# Patient Record
Sex: FEMALE | Race: AMERICAN INDIAN OR ALASKA NATIVE
[De-identification: names, ages, dates, MRNs, and addresses within clinical notes are randomized per-mention and may not be internally consistent; named-entity substitution may affect disease eponyms.]

---

## 2017-06-13 ENCOUNTER — PRIOR ORIGINAL RECORDS (OUTPATIENT)
Dept: OTHER | Age: 52
End: 2017-06-13

## 2017-06-13 ENCOUNTER — MYAURORA ACCOUNT LINK (OUTPATIENT)
Dept: OTHER | Age: 52
End: 2017-06-13

## 2017-06-19 ENCOUNTER — MYAURORA ACCOUNT LINK (OUTPATIENT)
Dept: OTHER | Age: 52
End: 2017-06-19

## 2017-06-21 ENCOUNTER — PRIOR ORIGINAL RECORDS (OUTPATIENT)
Dept: OTHER | Age: 52
End: 2017-06-21

## 2019-03-01 VITALS — HEART RATE: 62 BPM | DIASTOLIC BLOOD PRESSURE: 70 MMHG | SYSTOLIC BLOOD PRESSURE: 108 MMHG | WEIGHT: 135 LBS

## 2019-06-23 NOTE — ED PROVIDER NOTES
Patient presents with:  Laceration Abrasion (integumentary)      HPI:     Janae Aragon is a 52year old female with no significant past medical history presents with a laceration.   Patient reports yesterday around 2030 she was walking in high heels and trip drainage or any other concerns should be seen immediately. Patient verbalized plan of care and stated understanding    No orders of the defined types were placed in this encounter.       Labs performed this visit:  No results found for this or any previous

## 2019-12-07 ENCOUNTER — APPOINTMENT (OUTPATIENT)
Dept: GENERAL RADIOLOGY | Facility: HOSPITAL | Age: 54
End: 2019-12-07
Attending: EMERGENCY MEDICINE
Payer: COMMERCIAL

## 2019-12-07 ENCOUNTER — HOSPITAL ENCOUNTER (EMERGENCY)
Facility: HOSPITAL | Age: 54
Discharge: HOME | End: 2019-12-07
Attending: EMERGENCY MEDICINE
Payer: COMMERCIAL

## 2019-12-07 VITALS
DIASTOLIC BLOOD PRESSURE: 78 MMHG | RESPIRATION RATE: 20 BRPM | OXYGEN SATURATION: 96 % | TEMPERATURE: 98 F | BODY MASS INDEX: 24.45 KG/M2 | HEIGHT: 63 IN | HEART RATE: 63 BPM | SYSTOLIC BLOOD PRESSURE: 130 MMHG | WEIGHT: 138 LBS

## 2019-12-07 DIAGNOSIS — S62.102A CLOSED FRACTURE OF LEFT WRIST, INITIAL ENCOUNTER: Primary | ICD-10-CM

## 2019-12-07 PROCEDURE — 99284 EMERGENCY DEPT VISIT MOD MDM: CPT

## 2019-12-07 PROCEDURE — 73100 X-RAY EXAM OF WRIST: CPT | Performed by: EMERGENCY MEDICINE

## 2019-12-07 PROCEDURE — 29125 APPL SHORT ARM SPLINT STATIC: CPT

## 2019-12-07 RX ORDER — IBUPROFEN 600 MG/1
600 TABLET ORAL ONCE
Status: COMPLETED | OUTPATIENT
Start: 2019-12-07 | End: 2019-12-07

## 2019-12-08 NOTE — ED PROVIDER NOTES
Patient Seen in: [facility] AND [facility] Emergency Department      History   Patient presents with:  Upper Extremity Injury    Stated Complaint: wrist pain    HPI    Patient is a 20-year-old right-handed female who presents with left wrist injury that occurred f/u with orthopedics. Provided with ibuprofen in ED. A short arm splint was applied to the left wrist.  After application of the splint I returned and re-examined the patient.   The splint was adequately immobilizing the joint and distal to the splint th

## 2019-12-08 NOTE — ED INITIAL ASSESSMENT (HPI)
Pt fell of a ladder yesterday. Landed on her left wrist. Swelling and bruising noted. No deformity noted. CMS intact.

## 2019-12-09 ENCOUNTER — HOSPITAL ENCOUNTER (EMERGENCY)
Facility: HOSPITAL | Age: 54
Discharge: LEFT BEFORE DISCHARGE | End: 2019-12-09
Payer: COMMERCIAL

## 2019-12-10 ENCOUNTER — TELEPHONE (OUTPATIENT)
Dept: ORTHOPEDICS CLINIC | Facility: CLINIC | Age: 54
End: 2019-12-10

## 2019-12-10 ENCOUNTER — OFFICE VISIT (OUTPATIENT)
Dept: ORTHOPEDICS CLINIC | Facility: CLINIC | Age: 54
End: 2019-12-10
Payer: COMMERCIAL

## 2019-12-10 VITALS — WEIGHT: 138 LBS | BODY MASS INDEX: 24.45 KG/M2 | HEIGHT: 63 IN

## 2019-12-10 DIAGNOSIS — S52.532A CLOSED COLLES' FRACTURE OF LEFT RADIUS, INITIAL ENCOUNTER: Primary | ICD-10-CM

## 2019-12-10 PROCEDURE — 99204 OFFICE O/P NEW MOD 45 MIN: CPT | Performed by: ORTHOPAEDIC SURGERY

## 2019-12-10 PROCEDURE — 25600 CLTX DST RDL FX/EPHYS SEP WO: CPT | Performed by: ORTHOPAEDIC SURGERY

## 2019-12-10 NOTE — H&P
NURSING INTAKE COMMENTS: Patient presents with:   Injury: Left wrist - onset 12/6/19 while being on a ladder she fell on the L wrist - was in ER next day and has x-rays in the system - was told she has a fx - has a soft cast on - has pain rated as 6/10 on a worsening heartburn, no diarrhea  : no dysuria, no blood in urine, no difficulty urinating, no incontinence  MUSCULOSKELETAL: no other musculoskeletal complaints other than in HPI  NEURO: no numbness or tingling, no weakness or balance disorder  PSYCHE: [facility] teleradiology service. There are no major discrepancies.   Dictated by (CST): Thuy Rendon MD on 12/08/2019 at 7:23     Approved by (CST): Thuy Rendon MD on 12/08/2019 at 7:25             No results found for: WBC, HGB, PLT   N

## 2019-12-10 NOTE — TELEPHONE ENCOUNTER
Pt went to [facility] emergency room 12-7-19. Closed fracture left wrist.  Soft cast.  Pt's friend is a pt of Dr. Raissa Lozada and would like to be seen. Pt is not sure when pt should be seen.   Please call pt to advise

## 2019-12-10 NOTE — TELEPHONE ENCOUNTER
-- Message is from the [facility]--    Reason for Call: Patient checking the status of refill needs medication today please follow up to advise     Caller Information       Type Contact Phone    07/27/2019 09:22 AM Interface (Incoming) [employer] [phone]          Alternative phone number: none    Turnaround time given to caller:   \"This message will be sent to [state Provider's name]. The clinical team will fulfill your request as soon as they review your message when the office opens on Monday (or after the holiday).\"     S/w pt and she states she fell off a ladder while cleaning windows and injured left wrist on 12/5 while in [place]. Pt went to ER on 12/7/19 and put in temp splint and had XR. appt made today @ 130pm w/ Dr. Milena Shetty. Directions given.

## 2019-12-17 ENCOUNTER — HOSPITAL ENCOUNTER (OUTPATIENT)
Dept: GENERAL RADIOLOGY | Facility: HOSPITAL | Age: 54
Discharge: HOME | End: 2019-12-17
Attending: ORTHOPAEDIC SURGERY
Payer: COMMERCIAL

## 2019-12-17 ENCOUNTER — OFFICE VISIT (OUTPATIENT)
Dept: ORTHOPEDICS CLINIC | Facility: CLINIC | Age: 54
End: 2019-12-17
Payer: COMMERCIAL

## 2019-12-17 VITALS — WEIGHT: 138 LBS | HEIGHT: 63 IN | BODY MASS INDEX: 24.45 KG/M2

## 2019-12-17 DIAGNOSIS — S52.532A CLOSED COLLES' FRACTURE OF LEFT RADIUS, INITIAL ENCOUNTER: ICD-10-CM

## 2019-12-17 DIAGNOSIS — Z47.89 ORTHOPEDIC AFTERCARE: Primary | ICD-10-CM

## 2019-12-17 DIAGNOSIS — Z47.89 ORTHOPEDIC AFTERCARE: ICD-10-CM

## 2019-12-17 PROCEDURE — 99024 POSTOP FOLLOW-UP VISIT: CPT | Performed by: ORTHOPAEDIC SURGERY

## 2019-12-17 PROCEDURE — 99212 OFFICE O/P EST SF 10 MIN: CPT | Performed by: ORTHOPAEDIC SURGERY

## 2019-12-17 PROCEDURE — 73110 X-RAY EXAM OF WRIST: CPT | Performed by: ORTHOPAEDIC SURGERY

## 2019-12-17 NOTE — PROGRESS NOTES
NURSING INTAKE COMMENTS: Patient presents with:  Fracture: Left wrist f/u - states she still has some pain rated as 3/10 on and off       HPI: This 62year old female presents today 11 days after left distal radius fracture.   He had no significant complain moves them all and flexion-extension easily and well. The cast was relatively clean and there were no sores proximally or distally. It was fitting comfortably. Musculoskeletal: As above  Neurological: Normal to all 5 digits of the left hand.     Imaging: both sides and wrapped with an Ace wrap in case it has to be removed emergently. She will find an orthopod in her hometown [place] to take an x-ray in the next 2 weeks. I anticipated the orthopod would also see her another time after that.   She will be

## 2022-02-14 ENCOUNTER — OFFICE VISIT (OUTPATIENT)
Dept: INTERNAL MEDICINE CLINIC | Facility: CLINIC | Age: 57
End: 2022-02-14
Payer: COMMERCIAL

## 2022-02-14 ENCOUNTER — HOSPITAL ENCOUNTER (OUTPATIENT)
Dept: CT IMAGING | Age: 57
Discharge: HOME | End: 2022-02-14
Attending: NURSE PRACTITIONER
Payer: COMMERCIAL

## 2022-02-14 VITALS
BODY MASS INDEX: 28.53 KG/M2 | OXYGEN SATURATION: 98 % | TEMPERATURE: 98 F | SYSTOLIC BLOOD PRESSURE: 138 MMHG | HEART RATE: 63 BPM | WEIGHT: 161 LBS | HEIGHT: 63 IN | DIASTOLIC BLOOD PRESSURE: 82 MMHG

## 2022-02-14 DIAGNOSIS — G44.85 PRIMARY STABBING HEADACHE: ICD-10-CM

## 2022-02-14 DIAGNOSIS — G44.52 NEW DAILY PERSISTENT HEADACHE: ICD-10-CM

## 2022-02-14 DIAGNOSIS — G44.52 NEW DAILY PERSISTENT HEADACHE: Primary | ICD-10-CM

## 2022-02-14 PROCEDURE — 3008F BODY MASS INDEX DOCD: CPT | Performed by: NURSE PRACTITIONER

## 2022-02-14 PROCEDURE — 3079F DIAST BP 80-89 MM HG: CPT | Performed by: NURSE PRACTITIONER

## 2022-02-14 PROCEDURE — 3075F SYST BP GE 130 - 139MM HG: CPT | Performed by: NURSE PRACTITIONER

## 2022-02-14 PROCEDURE — 70450 CT HEAD/BRAIN W/O DYE: CPT | Performed by: NURSE PRACTITIONER

## 2022-02-14 PROCEDURE — 99213 OFFICE O/P EST LOW 20 MIN: CPT | Performed by: NURSE PRACTITIONER

## 2022-02-14 NOTE — PATIENT INSTRUCTIONS
Excedrin for Migraine two tabs every 24 hours. Simply saline spray every evening  Zyrtec 10 mg po nightly. Cold packs over left eye.   CT of head

## 2022-02-14 NOTE — ASSESSMENT & PLAN NOTE
47-year-old female who does not have a primary care doctor is seeking medical care for her headache. She went to [place] and had a Covid test that was very painful in her lab not to [facility]. A week later she had nosebleeds and saw Dr. Marie Sinha. The treatment plan was Vaseline, Neosporin, and pinch the nose if she had any further bleeding. This plan worked well. Since then she has not had any further nosebleeds but has a pain left side of her head and behind her left eye. This pain is not relieved by Tylenol, ibuprofen or other headache medications over-the-counter. Pertinent negatives include no nausea, vomiting fever, or chills, and she is able to function. Patient is fearful that there is something serious going on in her head. Plan  Excedrin for Migraine two tabs every 24 hours. Simply saline spray every evening  Zyrtec 10 mg po nightly. Cold packs over left eye. CT of head- Negative    Patient informed to seek care at the emergency room if she has worsening headache after this treatment plan.

## 2024-03-12 ENCOUNTER — OFFICE VISIT (OUTPATIENT)
Dept: FAMILY MEDICINE CLINIC | Facility: CLINIC | Age: 59
End: 2024-03-12
Payer: COMMERCIAL

## 2024-03-12 ENCOUNTER — APPOINTMENT (OUTPATIENT)
Dept: GENERAL RADIOLOGY | Age: 59
End: 2024-03-12
Attending: Physician Assistant
Payer: COMMERCIAL

## 2024-03-12 ENCOUNTER — HOSPITAL ENCOUNTER (OUTPATIENT)
Age: 59
Discharge: HOME | End: 2024-03-12
Payer: COMMERCIAL

## 2024-03-12 VITALS
TEMPERATURE: 98 F | OXYGEN SATURATION: 97 % | WEIGHT: 138 LBS | SYSTOLIC BLOOD PRESSURE: 124 MMHG | DIASTOLIC BLOOD PRESSURE: 78 MMHG | BODY MASS INDEX: 24.45 KG/M2 | RESPIRATION RATE: 14 BRPM | HEIGHT: 63 IN | HEART RATE: 73 BPM

## 2024-03-12 VITALS
SYSTOLIC BLOOD PRESSURE: 140 MMHG | DIASTOLIC BLOOD PRESSURE: 86 MMHG | TEMPERATURE: 97 F | RESPIRATION RATE: 20 BRPM | HEART RATE: 58 BPM | OXYGEN SATURATION: 98 %

## 2024-03-12 DIAGNOSIS — R07.9 CHEST PAIN OF UNCERTAIN ETIOLOGY: Primary | ICD-10-CM

## 2024-03-12 DIAGNOSIS — R07.89 CHEST PRESSURE: Primary | ICD-10-CM

## 2024-03-12 LAB
#MXD IC: 0.6 X10ˆ3/UL (ref 0.1–1)
ATRIAL RATE: 51 BPM
BUN BLD-MCNC: 17 MG/DL (ref 7–18)
CHLORIDE BLD-SCNC: 104 MMOL/L (ref 98–112)
CO2 BLD-SCNC: 27 MMOL/L (ref 21–32)
CREAT BLD-MCNC: 0.9 MG/DL
DDIMER WHOLE BLOOD: <200 NG/ML DDU (ref ?–400)
EGFRCR SERPLBLD CKD-EPI 2021: 73 ML/MIN/1.73M2 (ref 60–?)
GLUCOSE BLD-MCNC: 98 MG/DL (ref 70–99)
HCT VFR BLD AUTO: 41.9 %
HCT VFR BLD CALC: 45 %
HGB BLD-MCNC: 14.3 G/DL
ISTAT IONIZED CALCIUM FOR CHEM 8: 1.39 MMOL/L (ref 1.12–1.32)
LYMPHOCYTES # BLD AUTO: 2.8 X10ˆ3/UL (ref 1–4)
LYMPHOCYTES NFR BLD AUTO: 32.1 %
MCH RBC QN AUTO: 30.1 PG (ref 26–34)
MCHC RBC AUTO-ENTMCNC: 34.1 G/DL (ref 31–37)
MCV RBC AUTO: 88.2 FL (ref 80–100)
MIXED CELL %: 6.6 %
NEUTROPHILS # BLD AUTO: 5.4 X10ˆ3/UL (ref 1.5–7.7)
NEUTROPHILS NFR BLD AUTO: 61.3 %
P AXIS: 35 DEGREES
P-R INTERVAL: 164 MS
PLATELET # BLD AUTO: 302 X10ˆ3/UL (ref 150–450)
POTASSIUM BLD-SCNC: 4.5 MMOL/L (ref 3.6–5.1)
Q-T INTERVAL: 418 MS
QRS DURATION: 80 MS
QTC CALCULATION (BEZET): 385 MS
R AXIS: 15 DEGREES
RBC # BLD AUTO: 4.75 X10ˆ6/UL
SODIUM BLD-SCNC: 141 MMOL/L (ref 136–145)
T AXIS: 32 DEGREES
TROPONIN I BLD-MCNC: <0.02 NG/ML
VENTRICULAR RATE: 51 BPM
WBC # BLD AUTO: 8.8 X10ˆ3/UL (ref 4–11)

## 2024-03-12 PROCEDURE — 71046 X-RAY EXAM CHEST 2 VIEWS: CPT | Performed by: PHYSICIAN ASSISTANT

## 2024-03-12 PROCEDURE — 84484 ASSAY OF TROPONIN QUANT: CPT | Performed by: PHYSICIAN ASSISTANT

## 2024-03-12 PROCEDURE — 99214 OFFICE O/P EST MOD 30 MIN: CPT | Performed by: PHYSICIAN ASSISTANT

## 2024-03-12 PROCEDURE — 93000 ELECTROCARDIOGRAM COMPLETE: CPT | Performed by: PHYSICIAN ASSISTANT

## 2024-03-12 PROCEDURE — 85025 COMPLETE CBC W/AUTO DIFF WBC: CPT | Performed by: PHYSICIAN ASSISTANT

## 2024-03-12 PROCEDURE — 80047 BASIC METABLC PNL IONIZED CA: CPT | Performed by: PHYSICIAN ASSISTANT

## 2024-03-12 NOTE — ED PROVIDER NOTES
Chief Complaint   Patient presents with    Chest Pain     History obtained from: patient   services not used     HPI:     Merlene Anderson is a 58 year old female who presents from [facility] with intermittent chest pain x 2 weeks.  Patient localizes pain to midsternal chest with radiation to mid back.  Patient states pain is intermittent and occurs daily and lasts for seconds to minutes before self resolving.  Patient states pain is not exertional.  Patient denies any aggravating or alleviating factors.  Patient also notes she is not sleeping very well and has a very stressful job.  Patient states she is otherwise healthy and does not regularly see a doctor.  Patient endorses history of left temporal headaches that come and go since 2022 which patient had CT for at that time that was unremarkable.  Patient takes daily vitamins and walks regularly.  Social alcohol use.  No recreational drug use.  Denies injury/trauma, exertional pain, shortness of breath, wheezing, numbness, weakness, leg swelling or pain, dizziness, lightheadedness, syncope, abdominal pain, nausea, vomiting.    Of note, patient had cardiac assessment prior to plastic surgery on her face on 6/13/2017.  Preoperative EKG showed sinus bradycardia with anterior T wave inversions. Patient underwent stress echocardiogram, results not found on chart review.     PMH  History reviewed. No pertinent past medical history.    PFSH    PFS asessment screens reviewed and agree.  Nurses notes reviewed I agree with documentation.    History reviewed. No pertinent family history.  Family history reviewed with patient/caregiver and is not pertinent to presenting problem.  Social History     Socioeconomic History    Marital status: Single     Spouse name: Not on file    Number of children: Not on file    Years of education: Not on file    Highest education level: Not on file   Occupational History    Not on file   Tobacco Use    Smoking status: Never    Smokeless  tobacco: Never   Vaping Use    Vaping Use: Never used   Substance and Sexual Activity    Alcohol use: Not on file    Drug use: Not on file    Sexual activity: Not on file   Other Topics Concern    Not on file   Social History Narrative    Not on file     Social Determinants of Health     Financial Resource Strain: Not on file   Food Insecurity: Not on file   Transportation Needs: Not on file   Physical Activity: Not on file   Stress: Not on file   Social Connections: Not on file   Housing Stability: Not on file         ROS:   Positive for stated complaint: chest pain   All other systems reviewed and negative except as noted above.  Constitutional and Vital Signs Reviewed.    Physical Exam:     Findings:    /86   Pulse 58   Temp 97.1 °F (36.2 °C) (Temporal)   Resp 20   SpO2 98%   GENERAL: well developed, no acute distress, non-toxic appearing   SKIN: good skin turgor, no obvious rashes  HEAD: normocephalic, atraumatic  EYES: sclera non-icteric bilaterally, conjunctiva clear bilaterally  EARS: canals clear bilaterally, TMs clear bilaterally  NOSE: nasal turbinates pink, normal mucosa  OROPHARYNX: MMM, pharynx clear, maintaining airway and secretions  NECK: supple, no lymphadenopathy, no nuchal rigidity, no trismus, no edema, phonation normal    CARDIO: bradycardia, regular rhythm, normal heart sounds, radial pulse 2+ bilaterally, PT pulses 2+ bilaterally, cap refill < 2 sec  LUNGS: clear to auscultation bilaterally, no increased WOB, no rales, rhonchi, or wheezes  GI: normoactive bowel sounds, abdomen soft and non-tender   EXTREMITIES: no cyanosis or edema, DUGAN without difficulty  NEURO: Cranial nerves II through XII intact, coordination intact, no palmar drift, strength and sensation intact, ambulating with steady gait  PSYCH: alert and oriented x3, answering questions appropriately, mood appropriate    MDM/Assessment/Plan:   Orders for this encounter:    Orders Placed This Encounter    XR CHEST PA + LAT  CHEST (CPT=71046)     Chest/Back Pressure Chest pain and back pain for 2 weeks.  Pt denies trauma.  Pt states she has not slept.  Pt states 2 weeks ago she was on a plane to [place].  Pt has not seen a primary care physician in 5 years.       Order Specific Question:   What is the Relevant Clinical Indication / Reason for Exam?     Answer:   Chest/Back Pressure     Order Specific Question:   Release to patient     Answer:   Immediate    D-Dimer,Triage     Order Specific Question:   Release to patient     Answer:   Immediate    POCT CBC     Order Specific Question:   Release to patient     Answer:   Immediate    EKG 12 Lead     Order Specific Question:   Release to patient     Answer:   Immediate    POCT ISTAT chem8 cartridge    ISTAT Troponin    iStat (Chem 8)    iStat (Troponin)    Insert Peripheral IV       Labs performed this visit:  Recent Results (from the past 10 hour(s))   EKG 12 Lead    Collection Time: 03/12/24  9:31 AM   Result Value Ref Range    Ventricular rate 51 BPM    Atrial rate 51 BPM    P-R Interval 164 ms    QRS Duration 80 ms    Q-T Interval 418 ms    QTC Calculation (Bezet) 385 ms    P Axis 35 degrees    R Axis 15 degrees    T Axis 32 degrees   D-Dimer,Triage    Collection Time: 03/12/24 10:00 AM   Result Value Ref Range    D-Dimer DDU <200 <400 ng/mL DDU   POCT CBC    Collection Time: 03/12/24 10:00 AM   Result Value Ref Range    WBC IC 8.8 4.0 - 11.0 x10ˆ3/uL    RBC IC 4.75 3.80 - 5.30 X10ˆ6/uL    HGB IC 14.3 12.0 - 16.0 g/dL    HCT IC 41.9 35.0 - 48.0 %    MCV IC 88.2 80.0 - 100.0 fL    MCH IC 30.1 26.0 - 34.0 pg    MCHC IC 34.1 31.0 - 37.0 g/dL    PLT .0 150.0 - 450.0 X10ˆ3/uL    # Neutrophil 5.4 1.5 - 7.7 X10ˆ3/uL    # Lymphocyte 2.8 1.0 - 4.0 X10ˆ3/uL    # Mixed Cells 0.6 0.1 - 1.0 X10ˆ3/uL    Neutrophil % 61.3 %    Lymphocyte % 32.1 %    Mixed Cell % 6.6 %   POCT ISTAT chem8 cartridge    Collection Time: 03/12/24 10:28 AM   Result Value Ref Range    ISTAT Sodium 141 136 - 145  mmol/L    ISTAT BUN 17 7 - 18 mg/dL    ISTAT Potassium 4.5 3.6 - 5.1 mmol/L    ISTAT Chloride 104 98 - 112 mmol/L    ISTAT Ionized Calcium 1.39 (H) 1.12 - 1.32 mmol/L    ISTAT Hematocrit 45 34 - 50 %    ISTAT Glucose 98 70 - 99 mg/dL    ISTAT TCO2 27 21 - 32 mmol/L    ISTAT Creatinine 0.90 0.55 - 1.02 mg/dL    eGFR-Cr 73 >=60 mL/min/1.73m2   ISTAT Troponin    Collection Time: 03/12/24 10:30 AM   Result Value Ref Range    ISTAT Troponin <0.02 <0.045 ng/mL ng/mL       Imaging performed this visit:  XR CHEST PA + LAT CHEST (CPT=71046)   Final Result   PROCEDURE: XR CHEST PA + LAT CHEST (CPT=71046)       COMPARISON: None.       INDICATIONS: Retrosternal chest pain x2 weeks.       TECHNIQUE:   Two views.         FINDINGS:    CARDIAC/VASC: Normal cardiac silhouette.  Unremarkable pulmonary    vasculature.     MEDIAST/PRASHANTH: No visible mass or adenopathy.    LUNGS/PLEURA: No significant pulmonary parenchymal abnormalities.  No    effusion or pleural thickening.     BONES: Mild scoliosis with mild degenerative disc disease and spondylosis.        OTHER: Negative.                     =====   CONCLUSION:    1. No acute cardiopulmonary disease               Dictated by (CST): Wong Guy MD on 3/12/2024 at 10:08 AM        Finalized by (CST): Wong Guy MD on 3/12/2024 at 10:14 AM                   Medical Decision Making  DDx includes ACS versus angina versus stress versus anxiety versus PE versus musculoskeletal pain versus other.  Patient is overall very well-appearing with stable vitals and tolerating oral intake.  No cardiac history.  EKG reviewed, sinus bradycardia, ventricular rate 51, axis normal, nonspecific T wave inversions redemonstrated in anteroseptal leads, no ST elevation.  Chest x-ray reviewed, no evidence of acute cardiopulmonary process.  Troponin within normal limits.  D-dimer within normal limits.  CBC reviewed, grossly unremarkable without evidence of infection or anemia.  BMP reviewed,  grossly unremarkable without evidence of electrolyte derangements or kidney dysfunction.    Reassessment, patient resting comfortably and remains well-appearing.  No new or worsening symptoms throughout IC stay.  Given grossly unremarkable workup as per above and symptoms ongoing for 2 weeks, will discharge patient to home with prompt follow-up for further cardiac evaluation.  Patient verbalizes understanding and agreement with this plan.  Discussed supportive care including rest and increased water intake.  Instructed patient to go directly to nearest ER with any worsening or concerning symptoms.  Follow-up with cardiology.    Amount and/or Complexity of Data Reviewed  Independent Historian: parent  External Data Reviewed: labs, ECG and notes.  Labs: ordered.  Radiology: ordered and independent interpretation performed.  ECG/medicine tests: ordered and independent interpretation performed.    Risk  OTC drugs.        Discussed case with Dr. Zavaleta who is in agreement with assessment and plan    Diagnosis:    ICD-10-CM    1. Chest pain of uncertain etiology  R07.9 XR CHEST PA + LAT CHEST (CPT=71046)     XR CHEST PA + LAT CHEST (CPT=71046)          All results reviewed and discussed with patient/patient's family. Patient/patient's family verbalize excellent understanding of instructions and feels comfortable with plan. All of patient's/patient's family's questions were addressed.   See AVS for detailed discharge instructions for your condition today.    Follow Up with:  Dedrick Yao, DO  [facility] WMarilyn ARGUELLESNA[address] [address]  [phone]    Schedule an appointment as soon as possible for a visit   [facility]      Risa Louis PA-C

## 2024-03-12 NOTE — PROGRESS NOTES
Patient, Marion Anderson , is a 58 year old female who presented today in clinic with chest pressure that radiates to her back X 2 weeks intermittently. Had headache intermittently.  Pt reports she is anxious and fatigued about her symptoms. PT denies n/v/sweating/SOB, radiating arm pain, jaw pain, or vision changes.     No hx of cardiac problems in past.     No family hx of cardiac problems      Vitals:    03/12/24 0829 03/12/24 0832   BP: (!) 144/92 124/78   Pulse: 73    Resp: 14    Temp: 97.5 °F (36.4 °C)    SpO2: 97%    Weight: 138 lb (62.6 kg)    Height: 5' 3\" (1.6 m)          No results found for this or any previous visit (from the past 168 hour(s)).    Exam:    GENERAL: appears well, well nourished, in no apparent distress  SKIN: no rashes,no suspicious lesions  HEAD: atraumatic, normocephalic.    EYES: conjunctiva clear  NECK: Supple, non-tender  LUNGS: clear to auscultation bilaterally, no wheezes or rhonchi. Breathing is non labored. Speaking in full sentences without hesitation  CARDIO: RRR without murmur  EXTREMITIES: no cyanosis, clubbing or edema, DP 2+ equal  LYMPH:  no lymphadenopathy.    NEURO: No focal deficits   Accompanied by: self  After triage and detailed discussion with patient/familiy, it was determined that they would benefit from a higher acuity or more comprehensive level of care.  I informed them of the scope of practice of the Walk-in Clinic and advised the be further evaluated at [facility] for further work up and examination.   Reported to: CAITLIN Lord  Patient/parent /family verbalized understanding of rationale for further evaluation and was stable upon departure from the Walk-in Clinic.

## 2024-03-12 NOTE — ED INITIAL ASSESSMENT (HPI)
Chest pain and back pain for 2 weeks.  Pt denies trauma.  Pt states she has not slept.  Pt states 2 weeks ago she was on a plane to [place].  Pt has not seen a primary care physician in 5 years.

## 2024-03-12 NOTE — DISCHARGE INSTRUCTIONS
Scheduled follow up with appointment with cardiology  Drink plenty of fluids and get plenty of rest   If you experience severe/worsening chest pain, shortness of breath, dizziness, lightheadedness, passing out, vomiting, or any other concerning symptoms, go directly to nearest ER immediately

## 2025-02-13 ENCOUNTER — LAB ENCOUNTER (OUTPATIENT)
Dept: LAB | Facility: HOSPITAL | Age: 60
End: 2025-02-13
Attending: INTERNAL MEDICINE
Payer: COMMERCIAL

## 2025-02-13 ENCOUNTER — OFFICE VISIT (OUTPATIENT)
Dept: SURGERY | Facility: CLINIC | Age: 60
End: 2025-02-13
Payer: COMMERCIAL

## 2025-02-13 VITALS
HEART RATE: 70 BPM | DIASTOLIC BLOOD PRESSURE: 80 MMHG | SYSTOLIC BLOOD PRESSURE: 130 MMHG | OXYGEN SATURATION: 99 % | BODY MASS INDEX: 26.75 KG/M2 | WEIGHT: 151 LBS | HEIGHT: 63 IN

## 2025-02-13 DIAGNOSIS — Z51.81 ENCOUNTER FOR THERAPEUTIC DRUG MONITORING: ICD-10-CM

## 2025-02-13 DIAGNOSIS — R41.840 ATTENTION OR CONCENTRATION DEFICIT: ICD-10-CM

## 2025-02-13 DIAGNOSIS — Z51.81 ENCOUNTER FOR THERAPEUTIC DRUG MONITORING: Primary | ICD-10-CM

## 2025-02-13 DIAGNOSIS — E66.3 OVERWEIGHT (BMI 25.0-29.9): ICD-10-CM

## 2025-02-13 DIAGNOSIS — R41.840 ATTENTION OR CONCENTRATION DEFICIT: Primary | ICD-10-CM

## 2025-02-13 LAB
ATRIAL RATE: 45 BPM
P AXIS: 34 DEGREES
P-R INTERVAL: 166 MS
Q-T INTERVAL: 438 MS
QRS DURATION: 78 MS
QTC CALCULATION (BEZET): 378 MS
R AXIS: 3 DEGREES
T AXIS: 4 DEGREES
VENTRICULAR RATE: 45 BPM

## 2025-02-13 PROCEDURE — 3008F BODY MASS INDEX DOCD: CPT | Performed by: INTERNAL MEDICINE

## 2025-02-13 PROCEDURE — 3079F DIAST BP 80-89 MM HG: CPT | Performed by: INTERNAL MEDICINE

## 2025-02-13 PROCEDURE — 93005 ELECTROCARDIOGRAM TRACING: CPT

## 2025-02-13 PROCEDURE — 3075F SYST BP GE 130 - 139MM HG: CPT | Performed by: INTERNAL MEDICINE

## 2025-02-13 PROCEDURE — 93010 ELECTROCARDIOGRAM REPORT: CPT | Performed by: STUDENT IN AN ORGANIZED HEALTH CARE EDUCATION/TRAINING PROGRAM

## 2025-02-13 PROCEDURE — 99205 OFFICE O/P NEW HI 60 MIN: CPT | Performed by: INTERNAL MEDICINE

## 2025-02-13 RX ORDER — LISDEXAMFETAMINE DIMESYLATE 20 MG/1
20 CAPSULE ORAL DAILY
Qty: 30 CAPSULE | Refills: 0 | Status: SHIPPED | OUTPATIENT
Start: 2025-04-16 | End: 2025-05-16

## 2025-02-13 RX ORDER — LISDEXAMFETAMINE DIMESYLATE 20 MG/1
20 CAPSULE ORAL DAILY
Qty: 30 CAPSULE | Refills: 0 | Status: SHIPPED | OUTPATIENT
Start: 2025-03-16 | End: 2025-04-15

## 2025-02-13 RX ORDER — LISDEXAMFETAMINE DIMESYLATE 20 MG/1
20 CAPSULE ORAL DAILY
Qty: 30 CAPSULE | Refills: 0 | Status: SHIPPED | OUTPATIENT
Start: 2025-02-13 | End: 2025-03-15

## 2025-02-13 NOTE — PROGRESS NOTES
The [facility]ness and Weight Loss Consultation Note       Patient:  Merlene Anderson  :      10/7/1965  MRN:      [id number]    Referring Provider: Dr. Van ref. provider found       Chief Complaint:    Chief Complaint   Patient presents with    Consult    Weight Management       SUBJECTIVE     History of Present Illness:  Merlene Anderson has come in for weight loss.       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 Weight has been a struggle for the past several years and is now starting to develop into co-morbidities that are worrisome to the patient. Patient is interested in losing weight, so it can stay off long term.    Patient also understands that this is a life style change and wants to get on track.      Past Medical History:   Past Medical History:    Overweight (BMI 25.0-29.9)       OBJECTIVE     Vitals: /80   Pulse 70   Ht 5' 3\" (1.6 m)   Wt 151 lb (68.5 kg)   SpO2 99%   BMI 26.75 kg/m²      Patient Medications:    Current Outpatient Medications   Medication Sig Dispense Refill    lisdexamfetamine (VYVANSE) 20 MG Oral Cap Take 1 capsule (20 mg total) by mouth daily. 30 capsule 0    [START ON 3/16/2025] lisdexamfetamine (VYVANSE) 20 MG Oral Cap Take 1 capsule (20 mg total) by mouth daily. 30 capsule 0    [START ON 2025] lisdexamfetamine (VYVANSE) 20 MG Oral Cap Take 1 capsule (20 mg total) by mouth daily. 30 capsule 0    Tretinoin 0.05 % Apply Externally Cream Apply  topically nightly.         Allergies:  Penicillins     Comorbidities:      Social History:    Social History     Socioeconomic History    Marital status: Single     Spouse name: Not on file    Number of children: Not on file    Years of education: Not on file    Highest education level: Not on file   Occupational History    Not on file   Tobacco Use    Smoking status: Never     Smokeless tobacco: Never   Vaping Use    Vaping status: Never Used   Substance and Sexual Activity    Alcohol use: Yes     Comment: occ    Drug use: Never    Sexual activity: Not on file   Other Topics Concern    Not on file   Social History Narrative    ** Merged History Encounter **          Social Drivers of Health     Food Insecurity: Not on file   Transportation Needs: Not on file   Stress: Not on file   Housing Stability: Not on file     Surgical History:    Past Surgical History:   Procedure Laterality Date           delivery only         Family History:  No family history on file.        Typical Dietary Intake:  Breakfast AM Snack Lunch PM Snack Dinner   Smoothie, oats, fruit, maple syrup.  water Salad, tuna, water Dk jocelyn Popcorn, chicken, burgers, veggies, potatoes     Soda Drinker?: Yes  If yes, how much?:  0 calorie     Number of restaurant or fast food meals/week:  0 meals/week    Exercise: walking     Nutritional Goals Reviewed and Discussed:     Limit carbohydrates to 100 gms per day, Eat 100-200 calories within 1 hour of waking up, and Eat 3-4 cups of fresh fruit or vegetables daily    Behavior Modifications Reviewed and Discussed:    Eat breakfast, Eat 3 meals per day, Plan meals in advance, Read nutrition labels, Drink 64oz of water per day, Maintain a daily food journal, No drinking 30 minutes before or after meals, Utilize portion control strategies to reduce calorie intake, Identify triggers for eating and manage cues, and Eat slowly and take 20 to 30 minutes to complete each meal      ROS:  Constitutional: positive for fatigue  Respiratory: negative  Cardiovascular: negative  Gastrointestinal: negative  Musculoskeletal:negative  Neurological: negative  Behavioral/Psych: negative  Endocrine: negative    Physical Exam:  General appearance: alert, appears stated age, and cooperative  Head: Normocephalic, without obvious abnormality, atraumatic  Back: symmetric, no curvature. ROM  normal. No CVA tenderness.  Lungs: clear to auscultation bilaterally  Heart: S1, S2 normal, no murmur, click, rub or gallop, regular rate and rhythm  Abdomen: soft, non-tender; bowel sounds normal; no masses,  no organomegaly  Extremities: extremities normal, atraumatic, no cyanosis or edema  Pulses: 2+ and symmetric  Skin: Skin color, texture, turgor normal. No rashes or lesions    ASSESSMENT         Encounter Diagnosis(ses):   1. Attention or concentration deficit    2. Overweight (BMI 25.0-29.9)        PLAN     Patient is not interested in bariatric surgery. Patient desires to pursue traditional weight loss at this time.      ADD: focus issues    Goals for next month:  1. Keep a food log.  2. Drink 48-64 ounces of non-caloric beverages per day. No fruit juices or regular soda.  3. Increase activity-upper body exercises, walk 10 minutes per day.  4. Increase fruit and vegetable servings to 5-6 per day.      Will start Vyvanse 20 mg  Needs EKG        Diagnoses and all orders for this visit:    Attention or concentration deficit  -     lisdexamfetamine (VYVANSE) 20 MG Oral Cap; Take 1 capsule (20 mg total) by mouth daily.  -     lisdexamfetamine (VYVANSE) 20 MG Oral Cap; Take 1 capsule (20 mg total) by mouth daily.  -     lisdexamfetamine (VYVANSE) 20 MG Oral Cap; Take 1 capsule (20 mg total) by mouth daily.    Overweight (BMI 25.0-29.9)        Raphael Portillo MD

## 2025-03-15 DIAGNOSIS — R41.840 ATTENTION OR CONCENTRATION DEFICIT: ICD-10-CM

## 2025-03-15 RX ORDER — LISDEXAMFETAMINE DIMESYLATE 20 MG/1
20 CAPSULE ORAL DAILY
Qty: 30 CAPSULE | Refills: 0 | Status: CANCELLED | OUTPATIENT
Start: 2025-03-15 | End: 2025-04-14

## 2025-03-17 DIAGNOSIS — R41.840 ATTENTION OR CONCENTRATION DEFICIT: ICD-10-CM

## 2025-03-18 RX ORDER — LISDEXAMFETAMINE DIMESYLATE 20 MG/1
20 CAPSULE ORAL DAILY
Qty: 30 CAPSULE | Refills: 0 | OUTPATIENT
Start: 2025-03-18 | End: 2025-04-17

## 2025-03-20 NOTE — PROGRESS NOTES
HPI:   Merlene Anderson is a 59 year old female who presents for a complete physical exam.  Last pap:    Menses:  postmenopausal, had ablation at 43 and never period after that   Exercise:  walking , yoga  Healthy eater  Vitamin D   New patient  Hasn't seen a doctor in years    Seeing Dr. Portillo for weight loss.  She is on Vyvanse for weight management and attention issues- helping a ton    No fam hx medical problems  Parents alive and healthy     RON  last month-+  grief from that      Mortgage banking   2 kids- son and daughter     Has boyfriend     Nonsmoker  Social alochol - minimal    Wt Readings from Last 6 Encounters:   25 147 lb 3.2 oz (66.8 kg)   25 151 lb (68.5 kg)   24 138 lb (62.6 kg)   22 161 lb (73 kg)   19 138 lb (62.6 kg)   12/10/19 138 lb (62.6 kg)     Body mass index is 26.08 kg/m².     No results found for: \"CHOLEST\", \"HDL\", \"LDL\", \"TRIGLY\"     Current Outpatient Medications   Medication Sig Dispense Refill    lisdexamfetamine (VYVANSE) 20 MG Oral Cap Take 1 capsule (20 mg total) by mouth daily. 30 capsule 0    [START ON 2025] lisdexamfetamine (VYVANSE) 20 MG Oral Cap Take 1 capsule (20 mg total) by mouth daily. 30 capsule 0    Tretinoin 0.05 % Apply Externally Cream Apply topically nightly.        Past Medical History:    Overweight (BMI 25.0-29.9)      Past Surgical History:   Procedure Laterality Date           delivery only        History reviewed. No pertinent family history.   Social History:   Social History     Socioeconomic History    Marital status: Single   Tobacco Use    Smoking status: Never    Smokeless tobacco: Never   Vaping Use    Vaping status: Never Used   Substance and Sexual Activity    Alcohol use: Yes     Comment: occ    Drug use: Never   Social History Narrative    ** Merged History Encounter **               REVIEW OF SYSTEMS:   GENERAL: feels well otherwise  SKIN: denies any unusual skin  lesions  EYES:no vision problems  LUNGS: denies shortness of breath  CARDIOVASCULAR: denies chest pain  GI: denies abdominal pain,  No constipation or diarrhea  : denies dysuria, vaginal discharge or itching  NEURO: denies headaches  PSYCHE: denies depression or anxiety, just grief    EXAM:   /82   Pulse 89   Ht 5' 3\" (1.6 m)   Wt 147 lb 3.2 oz (66.8 kg)   SpO2 98%   BMI 26.08 kg/m²   Body mass index is 26.08 kg/m².   GENERAL: well developed, well nourished,in no apparent distress  SKIN: no rashes,no suspicious lesions  HEENT: atraumatic, normocephalic,  EYES:,conjunctiva are clear  NECK: supple,no adenopathy  BREAST: no dominant or suspicious mass, no nipple discharge  LUNGS: clear to auscultation  CARDIO: RRR without murmur  GI: no masses appreciated, no HSM or tenderness  :introitus is normal,scant discharge,cervix is pink,no adnexal masses or tenderness   EXTREMITIES: no edema    ASSESSMENT AND PLAN:   Merlene Anderson is a 59 year old female who presents for a complete physical exam.  Encounter Diagnoses   Name Primary?    Physical exam Yes    Encounter for screening mammogram for malignant neoplasm of breast     Colon cancer screening      Discussed with patient pap smear guidelines and the importance of screening for cervical cancer  Discussed the importance of yearly mammograms starting at age 40 to help detect breast cancer.   Self breast exam explained and encouraged to perform monthly.   Health maintenance labs, recommend yearly: Lipids, CMP, and CBC.   Discussed importance of screening for colon cancer with colonoscopies starting at age 45, or sooner if high risk  Recommended low fat diet, low carb diet and regular aerobic exercise.    The patient indicates understanding of these issues and agrees to the plan.  The patient is asked to return for CPX in 1 yr.    1. Physical exam  Discussed recommended vaccines, shingles, pneumonia and Tdap.  She will think about this.  - CBC With  Differential With Platelet; Future  - Comp Metabolic Panel (14); Future  - TSH W Reflex To Free T4; Future  - Vitamin D, 25-Hydroxy; Future  - Lipid Panel; Future  - ThinPrep PAP Smear [E]; Future  - Hpv High Risk , Thin Prep Collect; Future    2. Encounter for screening mammogram for malignant neoplasm of breast      - [facility] YFN 2D+3D SCREENING BILAT (CPT=77067/63050); Future    3. Colon cancer screening      - Gastro GI Telephone Colon Screen; Future      No orders of the defined types were placed in this encounter.      Meds & Refills for this Visit:  Requested Prescriptions      No prescriptions requested or ordered in this encounter       Imaging & Consults:  [facility] YFN 2D+3D SCREENING BILAT (CPT=77067/04890)  OP REFERRAL TO [facility] TELEPHONE COLON SCREEN

## 2025-03-22 ENCOUNTER — OFFICE VISIT (OUTPATIENT)
Dept: INTERNAL MEDICINE CLINIC | Facility: CLINIC | Age: 60
End: 2025-03-22
Payer: COMMERCIAL

## 2025-03-22 VITALS
DIASTOLIC BLOOD PRESSURE: 82 MMHG | WEIGHT: 147.19 LBS | OXYGEN SATURATION: 98 % | HEIGHT: 63 IN | HEART RATE: 89 BPM | BODY MASS INDEX: 26.08 KG/M2 | SYSTOLIC BLOOD PRESSURE: 130 MMHG

## 2025-03-22 DIAGNOSIS — Z12.31 ENCOUNTER FOR SCREENING MAMMOGRAM FOR MALIGNANT NEOPLASM OF BREAST: ICD-10-CM

## 2025-03-22 DIAGNOSIS — Z00.00 PHYSICAL EXAM: Primary | ICD-10-CM

## 2025-03-22 DIAGNOSIS — Z12.11 COLON CANCER SCREENING: ICD-10-CM

## 2025-03-22 PROCEDURE — 87624 HPV HI-RISK TYP POOLED RSLT: CPT | Performed by: FAMILY MEDICINE

## 2025-03-24 ENCOUNTER — TELEPHONE (OUTPATIENT)
Dept: GASTROENTEROLOGY | Facility: CLINIC | Age: 60
End: 2025-03-24

## 2025-03-24 ENCOUNTER — TELEPHONE (OUTPATIENT)
Facility: CLINIC | Age: 60
End: 2025-03-24

## 2025-03-24 LAB — HPV E6+E7 MRNA CVX QL NAA+PROBE: NEGATIVE

## 2025-03-24 NOTE — TELEPHONE ENCOUNTER
1st attempt to schedule telephone colon screening.     Left message to call back.     Plan to await call back or follow up.     [facility] Staff     Please schedule TCS appointment upon return call.     Thank you!

## 2025-03-25 NOTE — TELEPHONE ENCOUNTER
Pt had a physical with Dr Doss on 03/22/2025    She is needing her first colonoscopy    Please provide orders    She is not diabetic and does not take blood thinners    Taking Vyvanse for weight loss    Medications pharmacy and allergies reviewed    Pt would like a small volume bowel prep

## 2025-03-27 RX ORDER — SODIUM, POTASSIUM,MAG SULFATES 17.5-3.13G
SOLUTION, RECONSTITUTED, ORAL ORAL
Qty: 1 EACH | Refills: 0 | Status: SHIPPED | OUTPATIENT
Start: 2025-03-27

## 2025-03-27 NOTE — TELEPHONE ENCOUNTER
THANKS KAMILLE !!!!    New patient to me/no previous colonoscopy here.    Recent PCP visit with Dr. Ilene Doss 3/22/2025 reviewed.  Healthy woman BMI 26.  Also follows with Dr. Raphael Portillo.    GI schedulers -    Please schedule colonoscopy exam at [facility] ([facility])    BMI Readings from Last 1 Encounters:   03/22/25 26.08 kg/m²     MAC anesthesia     BP Readings from Last 5 Encounters:   03/22/25 130/82   02/13/25 130/80   03/12/24 140/86   03/12/24 124/78   02/14/22 138/82       Suprep small volume bowel prep if covered by insurance, otherwise   Golytely (PEG) 4L bowel prep  Rx sent in to [facility]      DX = Screening colonoscopy examination    Medication instructions:    Hold DIET PILLS weight loss medications Vyvanse 10 days before procedure

## 2025-04-12 DIAGNOSIS — R41.840 ATTENTION OR CONCENTRATION DEFICIT: ICD-10-CM

## 2025-04-14 RX ORDER — LISDEXAMFETAMINE DIMESYLATE 20 MG/1
20 CAPSULE ORAL DAILY
Qty: 30 CAPSULE | Refills: 0 | Status: SHIPPED | OUTPATIENT
Start: 2025-04-14 | End: 2025-05-14

## 2025-05-01 DIAGNOSIS — R41.840 ATTENTION OR CONCENTRATION DEFICIT: ICD-10-CM

## 2025-05-01 RX ORDER — LISDEXAMFETAMINE DIMESYLATE 20 MG/1
20 CAPSULE ORAL DAILY
Qty: 30 CAPSULE | Refills: 0 | Status: SHIPPED | OUTPATIENT
Start: 2025-05-01 | End: 2025-05-31

## 2025-05-14 DIAGNOSIS — R41.840 ATTENTION OR CONCENTRATION DEFICIT: ICD-10-CM

## 2025-05-14 RX ORDER — LISDEXAMFETAMINE DIMESYLATE 20 MG/1
20 CAPSULE ORAL DAILY
Qty: 30 CAPSULE | Refills: 0 | OUTPATIENT
Start: 2025-05-14 | End: 2025-06-13

## 2025-05-15 DIAGNOSIS — R41.840 ATTENTION OR CONCENTRATION DEFICIT: ICD-10-CM

## 2025-05-16 RX ORDER — LISDEXAMFETAMINE DIMESYLATE 20 MG/1
20 CAPSULE ORAL DAILY
Qty: 30 CAPSULE | Refills: 0 | Status: SHIPPED | OUTPATIENT
Start: 2025-05-16 | End: 2025-06-15

## 2025-05-24 DIAGNOSIS — R41.840 ATTENTION OR CONCENTRATION DEFICIT: ICD-10-CM

## 2025-05-27 RX ORDER — LISDEXAMFETAMINE DIMESYLATE 20 MG/1
20 CAPSULE ORAL DAILY
Qty: 30 CAPSULE | Refills: 0 | Status: SHIPPED | OUTPATIENT
Start: 2025-05-27 | End: 2025-06-26

## 2025-05-29 DIAGNOSIS — R41.840 ATTENTION OR CONCENTRATION DEFICIT: ICD-10-CM

## 2025-05-29 RX ORDER — LISDEXAMFETAMINE DIMESYLATE 20 MG/1
20 CAPSULE ORAL DAILY
Qty: 30 CAPSULE | Refills: 0 | Status: CANCELLED | OUTPATIENT
Start: 2025-05-29 | End: 2025-06-28

## (undated) NOTE — ED AVS SNAPSHOT
Francisco Covarrubias   MRN: [id number]    Department:  [facility] Emergency Department   Date of Visit:  12/7/2019           Disclosure     Insurance plans vary and the physician(s) referred by the ER may not be covered by your plan.  Please contact CARE PHYSICIAN AT ONCE OR RETURN IMMEDIATELY TO THE EMERGENCY DEPARTMENT. If you have been prescribed any medication(s), please fill your prescription right away and begin taking the medication(s) as directed.   If you believe that any of the medications